# Patient Record
Sex: MALE | Race: WHITE
[De-identification: names, ages, dates, MRNs, and addresses within clinical notes are randomized per-mention and may not be internally consistent; named-entity substitution may affect disease eponyms.]

---

## 2017-05-15 NOTE — HP
[facility]





- Chief Complaint


Chief Complaint: left knee pain





- Past Medical History


Allergies/Adverse Reactions: 


 Allergies











Allergy/AdvReac Type Severity Reaction Status Date / Time


 


No Known Drug Allergies Allergy   Verified 05/01/17 13:49














- Current Medications


Current Medications: 


 Home Medications











 Medication  Instructions  Recorded


 


Aspirin Coated [Ecotrin -] 325 mg PO DAILY 10/02/12


 


Cholecalciferol (Vitamin D3) 50,000 unit PO WEEKLY 10/02/12





[Vitamin D3]  


 


Metformin HCl [Glucophage] 1,000 mg PO BID 10/02/12


 


Omeprazole [Prilosec (RX)] 20 mg PO DAILY 10/02/12


 


Simvastatin [Zocor -] 40 mg PO HS 10/02/12


 


Allopurinol 300 mg PO DAILY 05/01/17


 


Liraglutide [Victoza -] 1.8 mg SQ DAILY@0700 05/01/17


 


Metoprolol Succinate [Toprol Xl -] 25 mg PO BID 05/01/17


 


Ramipril 10 mg PO DAILY 05/01/17


 


Tamsulosin HCl 0.4 mg PO HS 05/01/17


 


Timolol [Betimol] 1 drp OD DAILY 05/01/17














Satellite Physical Exam





- Physical Examination


General Appearance: Well Nourished, Well Developed, Alert & Oriented x3


ENT: Clear


Lung: Normal air movement


Heart: Regular rate & rhythm


Extremities: Other (left knee -+ swelling, + ttp, dec rom, nvi xrays show 

severe tricompartmental djd)


Neurological: Intact, Alert, Oriented





Satellite Impression/Plan





- Impression/Plan


Impression: left knee djd


Operative Procedure: left kathleen tkr


Date to be Performed: 05/16/17

## 2017-05-16 ENCOUNTER — HOSPITAL ENCOUNTER (INPATIENT)
Dept: HOSPITAL 74 - FM/S | Age: 67
LOS: 2 days | Discharge: HOME HEALTH | DRG: 470 | End: 2017-05-18
Attending: ORTHOPAEDIC SURGERY | Admitting: ORTHOPAEDIC SURGERY
Payer: COMMERCIAL

## 2017-05-16 VITALS — BODY MASS INDEX: 38 KG/M2

## 2017-05-16 DIAGNOSIS — E78.5: ICD-10-CM

## 2017-05-16 DIAGNOSIS — I10: ICD-10-CM

## 2017-05-16 DIAGNOSIS — E11.9: ICD-10-CM

## 2017-05-16 DIAGNOSIS — E66.9: ICD-10-CM

## 2017-05-16 DIAGNOSIS — M17.12: Primary | ICD-10-CM

## 2017-05-16 PROCEDURE — 8E0Y0CZ ROBOTIC ASSISTED PROCEDURE OF LOWER EXTREMITY, OPEN APPROACH: ICD-10-PCS | Performed by: ORTHOPAEDIC SURGERY

## 2017-05-16 PROCEDURE — 0SRD0J9 REPLACEMENT OF LEFT KNEE JOINT WITH SYNTHETIC SUBSTITUTE, CEMENTED, OPEN APPROACH: ICD-10-PCS | Performed by: ORTHOPAEDIC SURGERY

## 2017-05-16 RX ADMIN — INSULIN ASPART SCH: 100 INJECTION, SOLUTION INTRAVENOUS; SUBCUTANEOUS at 17:04

## 2017-05-16 RX ADMIN — DOCUSATE SODIUM,SENNOSIDES SCH TABLET: 50; 8.6 TABLET, FILM COATED ORAL at 21:49

## 2017-05-16 RX ADMIN — TAMSULOSIN HYDROCHLORIDE SCH MG: 0.4 CAPSULE ORAL at 21:49

## 2017-05-16 RX ADMIN — ACETAMINOPHEN SCH MG: 325 TABLET ORAL at 20:19

## 2017-05-16 RX ADMIN — ONDANSETRON PRN MG: 2 INJECTION INTRAMUSCULAR; INTRAVENOUS at 17:22

## 2017-05-16 RX ADMIN — CEFAZOLIN SODIUM SCH MLS/HR: 2 SOLUTION INTRAVENOUS at 19:50

## 2017-05-16 RX ADMIN — ATORVASTATIN CALCIUM SCH MG: 40 TABLET, FILM COATED ORAL at 21:49

## 2017-05-16 RX ADMIN — INSULIN ASPART SCH: 100 INJECTION, SOLUTION INTRAVENOUS; SUBCUTANEOUS at 22:14

## 2017-05-16 RX ADMIN — GABAPENTIN SCH MG: 300 CAPSULE ORAL at 21:50

## 2017-05-16 RX ADMIN — ONDANSETRON PRN MG: 2 INJECTION INTRAMUSCULAR; INTRAVENOUS at 15:17

## 2017-05-16 RX ADMIN — OXYCODONE HYDROCHLORIDE SCH MG: 10 TABLET, FILM COATED, EXTENDED RELEASE ORAL at 21:49

## 2017-05-16 RX ADMIN — METOPROLOL SUCCINATE SCH MG: 25 TABLET, EXTENDED RELEASE ORAL at 21:49

## 2017-05-16 NOTE — OP
Operative Note





- Note:


Operative Date: 05/16/17 (freida)


Pre-Operative Diagnosis: left knee djd


Operation: left kathleen tkr


Post-Operative Diagnosis: Same as Pre-op


Surgeon: Farzad Watson


Assistant: Harsh Phelps


Anesthesiologist/CRNA: Virginia Malhotra


Anesthesia: Spinal, Local


Specimens Removed: bone fragments


Estimated Blood Loss (mls): 50 (tourniquet)


Operative Report Dictated: Yes

## 2017-05-16 NOTE — SPEC
DATE OF SURGERY:  05/16/2017

 

OPERATION:  Left total knee replacement with robotic-assisted navigation

(MAKOplasty).

 

PREOPERATIVE DIAGNOSIS:  Degenerative joint disease, left knee.

 

POSTOPERATIVE DIAGNOSIS:  Degenerative joint disease, left knee.

 

SURGEON:  Farzad Watson M.D.

 

ASSISTANT:  KAREN Mckeon

 

ANESTHESIA:   Spinal and regional.

 

CLOSURE:   A Triathlon knee system with a 6 femur, a 6 tibia, an 11 TS polyethylene,

a 38 patella.  A number 1 Vicryl fascia, 0 and 2-0 for subcutaneous, 3-0 Monocryl

subcuticular with skin glue for skin, 4-0 undyed Vicryl for pin sites.

 

ESTIMATED BLOOD LOSS:  Negligible.

 

TOURNIQUET TIME:  Approximately 90 minutes.

 

COMPLICATIONS:  None

 

CONDITION:  To recovery room in stable condition.

 

DESCRIPTION OF PROCEDURE:  Patient was taken to the operating room on May 16, 2017. 

Regional and spinal anesthesia were administered by the anesthesiologist.  IV

antibiotics and TXA were administered prophylactically prior to the case.  A

well-padded pneumatic tourniquet was placed on the left proximal thigh.  The left

lower extremity was prepped and draped in the usual sterile fashion.  An

approximately 12-cm midline incision centered over the patella was incised. 

Hemostasis was achieved with Bovie cautery.  Sharp dissection was carried down to the

level of the extensor mechanism _____ the procedure.  The medial parapatellar

arthrotomy was then performed.  The patella was inverted and the knee was flexed up

to 90 degrees.  Subperiosteal dissection was performed on the anteromedial proximal

tibia until the knee was able to be brought forward.  This was facilitated by taking

the ACL, the PCL, and the medial and lateral menisci.  A checkpoint was malleted into

the medial femoral condyle and into the anteromedial proximal tibia.  Through two

small stab incisions in the mid femur and two in the mid tibia, two bicortical pins

were drilled, achieving excellent height.  Two of these pins were attached to the

navigation arrays.  The knee was then registered with the navigation device by

rotating the hip to ascertain the center of rotation of the hip with points on both

the medial and lateral malleoli and multiple points on both the femur and on the

tibia.  Excellent registration was confirmed by "popping the bubbles."  At this time,

the osteophytes on the edges of the proximal tibia both medially and laterally, as

well as on the medial lateral femoral condyles underneath the collateral ligaments

were debrided.  The knee was stressed in extension and in flexion to confirm good

gaps.  The virtual positions of the components were then optimized in order to have a

balanced knee, both in extension and in 90 degrees of flexion.  The sizes of the

components were also optimized to get good coverage over both the tibia and the femur

and to produce equal gaps in extension and flexion with the appropriate amount of

external rotation of the femur, the appropriate amount of flexion of the femoral

component and the appropriate slope on the tibial component.  At this time, the robot

was brought into the field and registered.  The robot was used to cut the proximal

tibia and to make all the cuts on the distal femur.  The bone was then removed.  A

spacer block in extension and flexion was used to confirm equal balancing of the

component in both extension and 90 degrees of flexion.  The box for the posterior

cruciate sacrificing component was then performed and a trial component on the femur

and tibia was applied.  The femoral component was clipped into place with the

appropriate external rotation.  This was confirmed by the navigation device, ensuring

the appropriate position of the tibial component on the proximal tibia.  The patella

was calipered for thickness and osteotomized at the appropriate level.  A lollipop

was used to drill the three lugholes in the patella and then a trial component was

applied.  The knee was taken through a range of motion and found to have excellent

tracking of the patella from full extension to full flexion, with good stability,

varus/valgus throughout range of motion.  The trial components were then removed. 

Before removing the tibial tray, the keyhole was made.  The knee was then thoroughly

irrigated with antibiotic irrigation.  The real components were then cemented in,

using modern generation cement techniques with antibiotic cement and pressurization. 

After the cement was hardened, the knee was thoroughly inspected to remove all excess

cement.  The real polyethylene component was then clipped into place.  Again, range

of motion, stability and tracking were found to be excellent throughout.  The knee

was then pulse antibiotic irrigated and dried.  Vancomycin powder was placed into the

knee.  The checkpoints were removed.  The medial parapatellar arthrotomy was then

closed using number 1 Vicryl interrupted suture.  The knee was again taken through

range of motion and found to have no undue tension on the repair and good tracking

throughout.  The subcutaneous was closed with 0 and 2-0 Vicryl and 3-0 Monocryl

subcuticular for skin with skin glue.  The pins were removed in the femur and the

tibia and pulse antibiotic irrigated and closed with 4-0 undyed Vicryl.  Sterile

Aquacel dressing followed by a Castorena dressing was applied.  The tourniquet was then

deflated.  One more dose of TXA was administered at the end of the case.  The patient

was awakened from anesthesia and transferred to the recovery room in stable

condition.  X-rays revealed good position of the components.  There were no

complications.  Estimated blood loss was negligible.  Total tourniquet time was

approximately 90 minutes. 

 

 

BARI [id number]

DD: 05/16/2017 16:11

DT: 05/16/2017 19:29

Job #:  [id number]

## 2017-05-17 LAB
DEPRECATED RDW RBC AUTO: 15 % (ref 11.9–15.9)
MCH RBC QN AUTO: 29.9 PG (ref 25.7–33.7)
MCHC RBC AUTO-ENTMCNC: 32.8 G/DL (ref 32–35.9)
MCV RBC: 91 FL (ref 80–96)
PLATELET # BLD AUTO: 281 K/MM3 (ref 134–434)
PMV BLD: 9.4 FL (ref 7.5–11.1)
WBC # BLD AUTO: 9.3 K/MM3 (ref 4–10.8)

## 2017-05-17 RX ADMIN — OXYCODONE HYDROCHLORIDE SCH MG: 10 TABLET, FILM COATED, EXTENDED RELEASE ORAL at 21:32

## 2017-05-17 RX ADMIN — METFORMIN HYDROCHLORIDE SCH MG: 500 TABLET ORAL at 18:47

## 2017-05-17 RX ADMIN — INSULIN ASPART SCH UNITS: 100 INJECTION, SOLUTION INTRAVENOUS; SUBCUTANEOUS at 16:53

## 2017-05-17 RX ADMIN — FERROUS SULFATE TAB EC 324 MG (65 MG FE EQUIVALENT) SCH MG: 324 (65 FE) TABLET DELAYED RESPONSE at 10:17

## 2017-05-17 RX ADMIN — GABAPENTIN SCH MG: 300 CAPSULE ORAL at 21:32

## 2017-05-17 RX ADMIN — RAMIPRIL SCH MG: 5 CAPSULE ORAL at 10:17

## 2017-05-17 RX ADMIN — METFORMIN HYDROCHLORIDE SCH MG: 500 TABLET ORAL at 08:17

## 2017-05-17 RX ADMIN — ACETAMINOPHEN SCH MG: 325 TABLET ORAL at 08:18

## 2017-05-17 RX ADMIN — TAMSULOSIN HYDROCHLORIDE SCH MG: 0.4 CAPSULE ORAL at 21:31

## 2017-05-17 RX ADMIN — DOCUSATE SODIUM,SENNOSIDES SCH TABLET: 50; 8.6 TABLET, FILM COATED ORAL at 21:31

## 2017-05-17 RX ADMIN — PANTOPRAZOLE SODIUM SCH MG: 20 TABLET, DELAYED RELEASE ORAL at 10:18

## 2017-05-17 RX ADMIN — METOPROLOL SUCCINATE SCH MG: 25 TABLET, EXTENDED RELEASE ORAL at 10:19

## 2017-05-17 RX ADMIN — ACETAMINOPHEN SCH MG: 325 TABLET ORAL at 03:26

## 2017-05-17 RX ADMIN — ALLOPURINOL SCH MG: 300 TABLET ORAL at 10:19

## 2017-05-17 RX ADMIN — CEFAZOLIN SODIUM SCH MLS/HR: 2 SOLUTION INTRAVENOUS at 03:26

## 2017-05-17 RX ADMIN — ACETAMINOPHEN SCH MG: 325 TABLET ORAL at 15:16

## 2017-05-17 RX ADMIN — ASPIRIN SCH MG: 325 TABLET ORAL at 10:17

## 2017-05-17 RX ADMIN — ACETAMINOPHEN SCH MG: 325 TABLET ORAL at 21:32

## 2017-05-17 RX ADMIN — GABAPENTIN SCH MG: 300 CAPSULE ORAL at 10:17

## 2017-05-17 RX ADMIN — MULTIVITAMIN TABLET SCH TAB: TABLET at 10:18

## 2017-05-17 RX ADMIN — INSULIN ASPART SCH: 100 INJECTION, SOLUTION INTRAVENOUS; SUBCUTANEOUS at 11:15

## 2017-05-17 RX ADMIN — METOPROLOL SUCCINATE SCH MG: 25 TABLET, EXTENDED RELEASE ORAL at 21:31

## 2017-05-17 RX ADMIN — TIMOLOL MALEATE SCH DROP: 5 SOLUTION OPHTHALMIC at 10:18

## 2017-05-17 RX ADMIN — ATORVASTATIN CALCIUM SCH MG: 40 TABLET, FILM COATED ORAL at 21:31

## 2017-05-17 RX ADMIN — OXYCODONE HYDROCHLORIDE SCH MG: 10 TABLET, FILM COATED, EXTENDED RELEASE ORAL at 10:16

## 2017-05-17 RX ADMIN — INSULIN ASPART SCH: 100 INJECTION, SOLUTION INTRAVENOUS; SUBCUTANEOUS at 06:34

## 2017-05-17 RX ADMIN — DOCUSATE SODIUM,SENNOSIDES SCH TABLET: 50; 8.6 TABLET, FILM COATED ORAL at 10:17

## 2017-05-17 NOTE — PN
Progress Note (short form)





- Note


Progress Note: 


Ortho





Pt seen and examined s/p left kathleen tkr pod #1


 Selected Entries











  05/17/17





  06:17


 


Temperature 98.5 F


 


Pulse Rate 91 H


 


Respiratory 18





Rate 


 


Blood Pressure 132/64








 Laboratory Tests











  05/17/17





  07:18


 


WBC  Pending


 


Hgb  Pending


 


Hct  Pending


 


Plt Count  Pending








dressing c/d/i, calf soft, nt


rom 0-50, nvi





a/p


PT


dvt ppx


pain control


d/c home tomorrow if stable

## 2017-05-17 NOTE — PN
Progress Note (short form)





- Note


Progress Note: 


ANESTHESIOLOGY POST-OP CHECK





66M s/p left knee replacement under spinal anesthesia with adductor canal block 

and catheter and tibial nerve block, POD #1.  no acute complaints. Denies N/V, 

backache, headache, and knee pain. Ambulating and voiding. C/o left lower 

extremity calf and heel pain which are chronic and relieved with oxycodone. Pt 

also states he has history of left foot drop/extensor weakness.


 





               Vital Signs











Temperature  98.5 F   05/17/17 06:17


 


Pulse Rate  91 H  05/17/17 06:17


 


Respiratory Rate  18   05/17/17 06:17


 


Blood Pressure  132/64   05/17/17 06:17


 


O2 Sat by Pulse Oximetry (%)  98   05/17/17 06:17








Active Medications





Acetaminophen (Tylenol -)  650 mg PO Q6H Pending sale to [facility]


   Stop: 05/19/17 20:59


   Last Admin: 05/17/17 08:18 Dose:  650 mg


Al Hydroxide/Mg Hydroxide (Mylanta Oral Suspension -)  30 ml PO Q4H PRN


   PRN Reason: DYSPEPSIA


Allopurinol (Zyloprim -)  300 mg PO DAILY Pending sale to [facility]


   Last Admin: 05/17/17 10:19 Dose:  300 mg


Aspirin (Ecotrin -)  325 mg PO DAILY Pending sale to [facility]


   Last Admin: 05/17/17 10:17 Dose:  325 mg


Atorvastatin Calcium (Lipitor -)  40 mg PO HS Pending sale to [facility]


   Last Admin: 05/16/17 21:49 Dose:  40 mg


Fentanyl (Sublimaze Injection -)  25 mcg IVPUSH [id number] PRN


   PRN Reason: PAIN


   Stop: 05/19/17 12:54


   Last Admin: 05/16/17 15:15 Dose:  25 mcg


Ferrous Sulfate (Feosol -)  325 mg PO DAILY Pending sale to [facility]


   Last Admin: 05/17/17 10:17 Dose:  325 mg


Gabapentin (Neurontin -)  300 mg PO BID Pending sale to [facility]


   Last Admin: 05/17/17 10:17 Dose:  300 mg


Lactated Ringer's (Lactated Ringers Solution)  1,000 mls @ 125 mls/hr IV ASDIR 

Pending sale to [facility]


Insulin Aspart (Novolog Vial Sliding Scale -)  1 vial SQ ACHS Pending sale to [facility]


   PRN Reason: Protocol


   Last Admin: 05/17/17 06:34 Dose:  Not Given


Liraglutide (Victoza -)  1.8 mg SQ DAILY@0700 Pending sale to [facility]


Magnesium Hydroxide (Milk Of Magnesia -)  30 ml PO PRN PRN


   PRN Reason: CONSTIPATION


Metformin HCl (Glucophage -)  1,000 mg PO BIDI Pending sale to [facility]


   Last Admin: 05/17/17 08:17 Dose:  1,000 mg


Metoprolol Succinate (Toprol Xl -)  25 mg PO BID Pending sale to [facility]


   Last Admin: 05/17/17 10:19 Dose:  25 mg


Multivitamins/Minerals/Vitamin C (Tab-A-Vit -)  1 tab PO DAILY Pending sale to [facility]


   Last Admin: 05/17/17 10:18 Dose:  1 tab


Ondansetron HCl (Zofran Injection)  4 mg IVPB Q6H PRN


   PRN Reason: NAUSEA


   Last Admin: 05/16/17 15:17 Dose:  4 mg


Oxycodone HCl (Oxycontin -)  10 mg PO BID Pending sale to [facility]


   Stop: 05/19/17 12:53


   Last Admin: 05/17/17 10:16 Dose:  10 mg


Oxycodone HCl (Roxicodone -)  5 mg PO Q4H PRN


   PRN Reason: PAIN LEVEL 1-5


   Stop: 05/19/17 12:53


   Last Admin: 05/17/17 08:35 Dose:  5 mg


Oxycodone HCl (Roxicodone -)  10 mg PO Q4H PRN


   PRN Reason: PAIN LEVEL 6-10


   Stop: 05/19/17 12:53


   Last Admin: 05/16/17 20:20 Dose:  10 mg


Pantoprazole Sodium (Protonix -)  20 mg PO DAILY Pending sale to [facility]


   Last Admin: 05/17/17 10:18 Dose:  20 mg


Ramipril (Altace -)  10 mg PO DAILY Pending sale to [facility]


   Last Admin: 05/17/17 10:17 Dose:  10 mg


Senna/Docusate Sodium (Pericolace -)  1 tablet PO BID Pending sale to [facility]


   Last Admin: 05/17/17 10:17 Dose:  1 tablet


Tamsulosin HCl (Flomax -)  0.4 mg PO HS Pending sale to [facility]


   Last Admin: 05/16/17 21:49 Dose:  0.4 mg


Timolol Maleate (Timoptic 0.5%)  1 drop OD DAILY Pending sale to [facility]


   Last Admin: 05/17/17 10:18 Dose:  1 drop








Gen: Awake, alert


Ext: No motor or sesnory deficits of right lower extremity. Decreased sensation 

of left lower leg on medial and lateral aspects. Weak dorsiflexion of left foot 

and extension of toes. Adductor canal catheter site clean, dry, intact.





No apparent anesthesia complications. Pain well controlled. Will pull adductor 

canal catheter tomorrow. Continue management as per primary team.

## 2017-05-18 VITALS — SYSTOLIC BLOOD PRESSURE: 104 MMHG | DIASTOLIC BLOOD PRESSURE: 53 MMHG | HEART RATE: 85 BPM | TEMPERATURE: 99.6 F

## 2017-05-18 LAB
DEPRECATED RDW RBC AUTO: 14.5 % (ref 11.9–15.9)
MCH RBC QN AUTO: 30 PG (ref 25.7–33.7)
MCHC RBC AUTO-ENTMCNC: 33.7 G/DL (ref 32–35.9)
MCV RBC: 89.2 FL (ref 80–96)
PLATELET # BLD AUTO: 231 K/MM3 (ref 134–434)
PMV BLD: 9.1 FL (ref 7.5–11.1)
WBC # BLD AUTO: 11.9 K/MM3 (ref 4–10.8)

## 2017-05-18 RX ADMIN — GABAPENTIN SCH MG: 300 CAPSULE ORAL at 10:00

## 2017-05-18 RX ADMIN — METFORMIN HYDROCHLORIDE SCH MG: 500 TABLET ORAL at 06:23

## 2017-05-18 RX ADMIN — ACETAMINOPHEN SCH MG: 325 TABLET ORAL at 03:11

## 2017-05-18 RX ADMIN — INSULIN ASPART SCH: 100 INJECTION, SOLUTION INTRAVENOUS; SUBCUTANEOUS at 06:24

## 2017-05-18 RX ADMIN — RAMIPRIL SCH MG: 5 CAPSULE ORAL at 10:02

## 2017-05-18 RX ADMIN — MULTIVITAMIN TABLET SCH TAB: TABLET at 10:01

## 2017-05-18 RX ADMIN — TIMOLOL MALEATE SCH DROP: 5 SOLUTION OPHTHALMIC at 10:03

## 2017-05-18 RX ADMIN — OXYCODONE HYDROCHLORIDE SCH MG: 10 TABLET, FILM COATED, EXTENDED RELEASE ORAL at 09:59

## 2017-05-18 RX ADMIN — ASPIRIN SCH MG: 325 TABLET ORAL at 10:01

## 2017-05-18 RX ADMIN — ACETAMINOPHEN SCH MG: 325 TABLET ORAL at 09:58

## 2017-05-18 RX ADMIN — METOPROLOL SUCCINATE SCH MG: 25 TABLET, EXTENDED RELEASE ORAL at 10:00

## 2017-05-18 RX ADMIN — FERROUS SULFATE TAB EC 324 MG (65 MG FE EQUIVALENT) SCH MG: 324 (65 FE) TABLET DELAYED RESPONSE at 10:03

## 2017-05-18 RX ADMIN — ALLOPURINOL SCH MG: 300 TABLET ORAL at 10:03

## 2017-05-18 RX ADMIN — DOCUSATE SODIUM,SENNOSIDES SCH TABLET: 50; 8.6 TABLET, FILM COATED ORAL at 10:01

## 2017-05-18 RX ADMIN — INSULIN ASPART SCH: 100 INJECTION, SOLUTION INTRAVENOUS; SUBCUTANEOUS at 06:25

## 2017-05-18 RX ADMIN — PANTOPRAZOLE SODIUM SCH MG: 20 TABLET, DELAYED RELEASE ORAL at 10:01

## 2017-05-18 NOTE — PN
Progress Note (short form)





- Note


Progress Note: 


Ortho





Pt seen and examined s/p left kathleen tkr pod #2


 


 Selected Entries











  05/18/17





  06:00


 


Temperature 99.6 F


 


Pulse Rate 85


 


Respiratory 19





Rate 


 


Blood Pressure 104/53








 Laboratory Tests











  05/17/17





  07:18


 


WBC  9.3  D


 


Hgb  12.4


 


Hct  37.7


 


Plt Count  281











dressing c/d/i, calf soft, nt


rom 0-50, nvi





a/p


PT


dvt ppx


pain control


d/c home today


f/u in 1 week

## 2017-05-18 NOTE — DS
Physical Examination


Vital Signs: 


 Vital Signs











Temperature  99.6 F   05/18/17 06:00


 


Pulse Rate  85   05/18/17 06:00


 


Respiratory Rate  19   05/18/17 06:00


 


Blood Pressure  104/53   05/18/17 06:00


 


O2 Sat by Pulse Oximetry (%)  91 L  05/18/17 06:00














Discharge Summary


Reason For Visit: OSTEOARTHRITIS


Procedures: Principal: s/p left kathleen tkr


Hospital Course: 


admitted for elective left kathleen tkr, uneventful post-op, stable for d/c


Condition: Good





- Instructions


Diet, Activity, Other Instructions: 


Post-op Instructions-Total Knee Replacement                                    

               





     Call the office for a follow-up  appointment in 1 week - [phone]


     Aspirin 325mg daily for 6 weeks. Pain medication was sent into your 

pharmacy.                      


     Apply  Graduated Compression Stockings (TEDs) to both lower extremities-

remove daily


      for hygiene  ONLY


     Apply Sequential Compression Device (SCDs)  to both Lower extremities 

remove for PT 


      and hygiene ONLY  


     Apply cold packs to affected area for 15 minutes every 2 hours.


     Physical Therapist will come to your home for the first 5 days. You will 

be set up with 


      outpatient PT at your first post-operative visit.


     Patient may ambulate as tolerated-encourage self care (at least every 2-3 

hours while awake) 


      with walker or cane


     Maintain Aquacel (waterproof) dressing to operative wound (will be 

removed by surgeon at 


      first office visit)


     Shower with Aquacel dressing in place-if Aquacel integrity compromised, 

remove and apply 


      dry sterile dressing and notify Orthopedist.  DO NOT SHOWER unless 

Orthopedists approves without Aquacel 


      dressing





     CONTACT THE OFFICE FOR ANY CHANGE IN YOUR CONDITION (for example-fever 


      greater than 102 degrees,excessive bleeding from operative site, purulent 

drainage, 


      severe swelling or pain)    GO TO THE EMERGENCY ROOM IF THERE IS A  

MEDICAL EMERGENCY





     Knee Precautions:  


      * Keep a rolled towel under affected heel while in bed or chair (to keep 

knee 


        in extension)


      * Keep affected leg elevated except during mealtimes


      * DO NOT PLACE PILLOW UNDER AFFECTED KNEE





  * If you have any questions, please do not hesitate to call the office - [phone].





Referrals: 


Farzad Watson MD [Staff Physician] - 


Disposition: [facility]/HOME HEALTH CARE





- Home Medications


Comprehensive Discharge Medication List: 


Ambulatory Orders





Aspirin Coated [Ecotrin -] 325 mg PO DAILY 10/02/12 


Metformin HCl [Glucophage] 1,000 mg PO BID 10/02/12 


Omeprazole [Prilosec (RX)] 20 mg PO DAILY 10/02/12 


Allopurinol 300 mg PO DAILY 05/01/17 


Liraglutide [Victoza -] 1.8 mg SQ DAILY@0700 05/01/17 


Metoprolol Succinate [Toprol XL -] 25 mg PO BID 05/01/17 


Ramipril 10 mg PO DAILY 05/01/17 


Tamsulosin HCl 0.4 mg PO HS 05/01/17 


Timolol [Betimol] 1 drp OD DAILY 05/01/17 


Acetaminophen [Tylenol .Extra-Strength -] 500 mg PO Q6H PRN 05/16/17 


Atorvastatin Ca [Lipitor] 40 mg PO DAILY 05/16/17 


Ferrous Sulfate 325 mg PO DAILY 05/16/17 


Oxycodone HCl/Acetaminophen [Percocet 5-325 mg Tablet -] 1 - 2 tab PO Q6H #50 

tab MDD 8 05/16/17

## 2017-05-18 NOTE — PN
Progress Note (short form)





- Note


Progress Note: 


S: Pt. in a chair. comfortable


O: VAS 3-7/10


A/P: POD #2 s/p left tkr


1. adductor canal cath pulled. tip intact. no complications

## 2017-05-24 NOTE — PATH
Surgical Pathology Report



Patient Name:  RICH CURIEL

Accession #:  

Med. Rec. #:  [id number]                                                        

   /Age/Gender:  1950 (Age: 66) / M

Account:  [id number]                                                          

             Location: [facility]

Taken:  2017

Received:  2017

Reported:  2017

Physicians:  Farzad Watson M.D.

  



Specimen(s) Received

 LEFT KNEE BONE AND TISSUE 





Clinical History

Left knee osteoarthritis







Final Diagnosis

KNEE BONE AND TISSUE, LEFT, TOTAL KNEE REPLACEMENT:

DEGENERATIVE JOINT DISEASE.





***Electronically Signed***

Kaye Mcrae M.D.





Gross Description

Received in formalin labeled "left knee bone and tissue," is a 12.0 x 9.5 x 1.5

cm aggregate of multiple irregular, unoriented portions of bone and soft tissue.

The tibial plateau measures 8.5 x 5.7 x 1.9 cm. There is a 1.5 cm in greatest

dimension area of eburnation present. The remaining articular surfaces are

tan-yellow and focally granular. The underlying trabecular bone is yellow and

hard. Representative sections are submitted in one cassette, following

decalcification.

## 2022-09-21 ENCOUNTER — RX ONLY (RX ONLY)
Age: 72
End: 2022-09-21

## 2022-09-21 ENCOUNTER — OFFICE (OUTPATIENT)
Dept: URBAN - METROPOLITAN AREA CLINIC 30 | Facility: CLINIC | Age: 72
Setting detail: OPHTHALMOLOGY
End: 2022-09-21
Payer: MEDICARE

## 2022-09-21 DIAGNOSIS — E11.9: ICD-10-CM

## 2022-09-21 DIAGNOSIS — H40.1312: ICD-10-CM

## 2022-09-21 DIAGNOSIS — H25.13: ICD-10-CM

## 2022-09-21 DIAGNOSIS — H40.1321: ICD-10-CM

## 2022-09-21 PROCEDURE — 92133 CPTRZD OPH DX IMG PST SGM ON: CPT | Performed by: OPHTHALMOLOGY

## 2022-09-21 PROCEDURE — 99204 OFFICE O/P NEW MOD 45 MIN: CPT | Performed by: OPHTHALMOLOGY

## 2022-09-21 PROCEDURE — 92083 EXTENDED VISUAL FIELD XM: CPT | Performed by: OPHTHALMOLOGY

## 2022-09-21 PROCEDURE — 92020 GONIOSCOPY: CPT | Performed by: OPHTHALMOLOGY

## 2022-09-21 PROCEDURE — 76514 ECHO EXAM OF EYE THICKNESS: CPT | Performed by: OPHTHALMOLOGY

## 2022-09-21 ASSESSMENT — REFRACTION_AUTOREFRACTION
OD_AXIS: 145
OD_CYLINDER: +0.50
OD_SPHERE: -13.75
OS_CYLINDER: +0.50
OS_AXIS: 115
OS_SPHERE: -8.25

## 2022-09-21 ASSESSMENT — TONOMETRY
OS_IOP_MMHG: 11
OD_IOP_MMHG: 12

## 2022-09-21 ASSESSMENT — PACHYMETRY
OD_CT_UM: 579
OD_CT_CORRECTION: -2
OS_CT_UM: 582
OS_CT_CORRECTION: -3

## 2022-09-21 ASSESSMENT — SPHEQUIV_DERIVED
OS_SPHEQUIV: -8
OD_SPHEQUIV: -13.5

## 2022-09-21 ASSESSMENT — CONFRONTATIONAL VISUAL FIELD TEST (CVF)
OD_FINDINGS: FULL
OS_FINDINGS: FULL

## 2022-09-21 ASSESSMENT — VISUAL ACUITY
OS_BCVA: CF 3FT
OD_BCVA: 20/70

## 2022-09-21 ASSESSMENT — REFRACTION_CURRENTRX
OS_OVR_VA: 20/
OD_CYLINDER: +1.00
OD_AXIS: 115
OS_CYLINDER: +1.00
OD_SPHERE: -8.50
OS_AXIS: 94
OS_SPHERE: -8.25
OD_OVR_VA: 20/

## 2022-11-29 ENCOUNTER — OFFICE (OUTPATIENT)
Dept: URBAN - METROPOLITAN AREA CLINIC 29 | Facility: CLINIC | Age: 72
Setting detail: OPHTHALMOLOGY
End: 2022-11-29
Payer: MEDICARE

## 2022-11-29 DIAGNOSIS — E11.9: ICD-10-CM

## 2022-11-29 DIAGNOSIS — H40.1312: ICD-10-CM

## 2022-11-29 DIAGNOSIS — H25.12: ICD-10-CM

## 2022-11-29 DIAGNOSIS — H40.1321: ICD-10-CM

## 2022-11-29 DIAGNOSIS — H52.213: ICD-10-CM

## 2022-11-29 DIAGNOSIS — H25.11: ICD-10-CM

## 2022-11-29 PROCEDURE — 92136 OPHTHALMIC BIOMETRY: CPT | Performed by: OPHTHALMOLOGY

## 2022-11-29 PROCEDURE — 99213 OFFICE O/P EST LOW 20 MIN: CPT | Performed by: OPHTHALMOLOGY

## 2022-11-29 PROCEDURE — 92025 CPTRIZED CORNEAL TOPOGRAPHY: CPT | Performed by: OPHTHALMOLOGY

## 2022-11-29 ASSESSMENT — REFRACTION_AUTOREFRACTION
OD_CYLINDER: +0.50
OS_SPHERE: -8.25
OD_AXIS: 145
OS_AXIS: 115
OD_SPHERE: -13.75
OS_CYLINDER: +0.50

## 2022-11-29 ASSESSMENT — SPHEQUIV_DERIVED
OS_SPHEQUIV: -8
OD_SPHEQUIV: -13.5

## 2022-11-29 ASSESSMENT — CONFRONTATIONAL VISUAL FIELD TEST (CVF)
OD_FINDINGS: FULL
OS_FINDINGS: FULL

## 2022-11-29 ASSESSMENT — PACHYMETRY
OS_CT_CORRECTION: -3
OS_CT_UM: 582
OD_CT_CORRECTION: -2
OD_CT_UM: 579

## 2022-11-29 ASSESSMENT — REFRACTION_CURRENTRX
OD_OVR_VA: 20/
OD_SPHERE: -8.50
OS_CYLINDER: +1.00
OD_CYLINDER: +1.00
OS_AXIS: 94
OS_OVR_VA: 20/
OD_AXIS: 115
OS_SPHERE: -8.25

## 2022-11-29 ASSESSMENT — VISUAL ACUITY
OD_BCVA: 20/70+1
OS_BCVA: CF 3FT

## 2022-11-29 ASSESSMENT — TONOMETRY
OD_IOP_MMHG: 15
OS_IOP_MMHG: 12

## 2022-12-22 ENCOUNTER — AMBULATORY SURGERY CENTER (OUTPATIENT)
Dept: URBAN - METROPOLITAN AREA SURGERY 17 | Facility: SURGERY | Age: 72
Setting detail: OPHTHALMOLOGY
End: 2022-12-22
Payer: MEDICARE

## 2022-12-22 DIAGNOSIS — H52.211: ICD-10-CM

## 2022-12-22 DIAGNOSIS — H40.1312: ICD-10-CM

## 2022-12-22 DIAGNOSIS — H25.11: ICD-10-CM

## 2022-12-22 PROCEDURE — A9270 NON-COVERED ITEM OR SERVICE: HCPCS | Performed by: OPHTHALMOLOGY

## 2022-12-22 PROCEDURE — FEMTO CATARACT LASER: Performed by: OPHTHALMOLOGY

## 2022-12-22 PROCEDURE — 65820 GONIOTOMY: CPT | Performed by: OPHTHALMOLOGY

## 2022-12-22 PROCEDURE — 66984 XCAPSL CTRC RMVL W/O ECP: CPT | Performed by: OPHTHALMOLOGY

## 2022-12-23 ENCOUNTER — RX ONLY (RX ONLY)
Age: 72
End: 2022-12-23

## 2022-12-23 ENCOUNTER — OFFICE (OUTPATIENT)
Dept: URBAN - METROPOLITAN AREA CLINIC 29 | Facility: CLINIC | Age: 72
Setting detail: OPHTHALMOLOGY
End: 2022-12-23
Payer: MEDICARE

## 2022-12-23 DIAGNOSIS — H21.233: ICD-10-CM

## 2022-12-23 DIAGNOSIS — E11.9: ICD-10-CM

## 2022-12-23 DIAGNOSIS — H40.1321: ICD-10-CM

## 2022-12-23 DIAGNOSIS — Z96.1: ICD-10-CM

## 2022-12-23 DIAGNOSIS — H52.213: ICD-10-CM

## 2022-12-23 DIAGNOSIS — H40.1312: ICD-10-CM

## 2022-12-23 DIAGNOSIS — H25.13: ICD-10-CM

## 2022-12-23 PROCEDURE — 99024 POSTOP FOLLOW-UP VISIT: CPT | Performed by: OPHTHALMOLOGY

## 2022-12-23 ASSESSMENT — REFRACTION_CURRENTRX
OD_AXIS: 115
OS_AXIS: 94
OD_OVR_VA: 20/
OS_SPHERE: -8.25
OD_CYLINDER: +1.00
OS_CYLINDER: +1.00
OD_SPHERE: -8.50
OS_OVR_VA: 20/

## 2022-12-23 ASSESSMENT — SPHEQUIV_DERIVED
OS_SPHEQUIV: -8
OD_SPHEQUIV: -13.5

## 2022-12-23 ASSESSMENT — CONFRONTATIONAL VISUAL FIELD TEST (CVF)
OS_FINDINGS: FULL
OD_FINDINGS: FULL

## 2022-12-23 ASSESSMENT — REFRACTION_AUTOREFRACTION
OS_AXIS: 115
OD_AXIS: 145
OD_CYLINDER: +0.50
OD_SPHERE: -13.75
OS_CYLINDER: +0.50
OS_SPHERE: -8.25

## 2022-12-23 ASSESSMENT — TONOMETRY
OD_IOP_MMHG: 16
OS_IOP_MMHG: 14

## 2022-12-23 ASSESSMENT — PACHYMETRY
OS_CT_UM: 582
OD_CT_UM: 579
OD_CT_CORRECTION: -2
OS_CT_CORRECTION: -3

## 2022-12-23 ASSESSMENT — VISUAL ACUITY
OD_BCVA: 20/70+2
OS_BCVA: 20/100

## 2023-01-04 ENCOUNTER — AMBULATORY SURGERY CENTER (OUTPATIENT)
Dept: URBAN - METROPOLITAN AREA SURGERY 17 | Facility: SURGERY | Age: 73
Setting detail: OPHTHALMOLOGY
End: 2023-01-04
Payer: MEDICARE

## 2023-01-04 DIAGNOSIS — H25.12: ICD-10-CM

## 2023-01-04 DIAGNOSIS — H40.1321: ICD-10-CM

## 2023-01-04 PROCEDURE — 66984 XCAPSL CTRC RMVL W/O ECP: CPT | Performed by: OPHTHALMOLOGY

## 2023-01-04 PROCEDURE — 65820 GONIOTOMY: CPT | Performed by: OPHTHALMOLOGY

## 2023-01-05 ENCOUNTER — RX ONLY (RX ONLY)
Age: 73
End: 2023-01-05

## 2023-01-05 ENCOUNTER — OFFICE (OUTPATIENT)
Dept: URBAN - METROPOLITAN AREA CLINIC 29 | Facility: CLINIC | Age: 73
Setting detail: OPHTHALMOLOGY
End: 2023-01-05
Payer: MEDICARE

## 2023-01-05 DIAGNOSIS — H40.1321: ICD-10-CM

## 2023-01-05 DIAGNOSIS — H52.213: ICD-10-CM

## 2023-01-05 DIAGNOSIS — H21.233: ICD-10-CM

## 2023-01-05 DIAGNOSIS — H25.12: ICD-10-CM

## 2023-01-05 DIAGNOSIS — Z96.1: ICD-10-CM

## 2023-01-05 DIAGNOSIS — H53.001: ICD-10-CM

## 2023-01-05 DIAGNOSIS — E11.9: ICD-10-CM

## 2023-01-05 DIAGNOSIS — H40.1312: ICD-10-CM

## 2023-01-05 PROCEDURE — 99024 POSTOP FOLLOW-UP VISIT: CPT | Performed by: OPHTHALMOLOGY

## 2023-01-05 ASSESSMENT — SPHEQUIV_DERIVED
OS_SPHEQUIV: -8
OD_SPHEQUIV: -13.5

## 2023-01-05 ASSESSMENT — CONFRONTATIONAL VISUAL FIELD TEST (CVF)
OS_FINDINGS: FULL
OD_FINDINGS: FULL

## 2023-01-05 ASSESSMENT — TONOMETRY
OS_IOP_MMHG: 14
OD_IOP_MMHG: 17

## 2023-01-05 ASSESSMENT — REFRACTION_CURRENTRX
OD_OVR_VA: 20/
OS_CYLINDER: +1.00
OS_OVR_VA: 20/
OD_SPHERE: -8.50
OS_AXIS: 94
OD_AXIS: 115
OD_CYLINDER: +1.00
OS_SPHERE: -8.25

## 2023-01-05 ASSESSMENT — REFRACTION_AUTOREFRACTION
OS_SPHERE: -8.25
OS_CYLINDER: +0.50
OD_CYLINDER: +0.50
OD_AXIS: 145
OS_AXIS: 115
OD_SPHERE: -13.75

## 2023-01-05 ASSESSMENT — PACHYMETRY
OD_CT_CORRECTION: -2
OS_CT_CORRECTION: -3
OD_CT_UM: 579
OS_CT_UM: 582

## 2023-01-05 ASSESSMENT — REFRACTION_MANIFEST
OS_SPHERE: -2.00
OD_CYLINDER: SPHERE
OD_SPHERE: -2.25
OS_CYLINDER: SPHERE
OS_VA1: 20/25-
OD_VA1: 20/70-

## 2023-01-05 ASSESSMENT — VISUAL ACUITY
OD_BCVA: 20/25
OS_BCVA: 20/70

## 2023-01-27 ENCOUNTER — OFFICE (OUTPATIENT)
Dept: URBAN - METROPOLITAN AREA CLINIC 29 | Facility: CLINIC | Age: 73
Setting detail: OPHTHALMOLOGY
End: 2023-01-27
Payer: MEDICARE

## 2023-01-27 DIAGNOSIS — E11.9: ICD-10-CM

## 2023-01-27 DIAGNOSIS — H21.233: ICD-10-CM

## 2023-01-27 DIAGNOSIS — H25.12: ICD-10-CM

## 2023-01-27 DIAGNOSIS — H52.213: ICD-10-CM

## 2023-01-27 DIAGNOSIS — H52.4: ICD-10-CM

## 2023-01-27 DIAGNOSIS — H53.001: ICD-10-CM

## 2023-01-27 DIAGNOSIS — H40.1321: ICD-10-CM

## 2023-01-27 DIAGNOSIS — H40.1312: ICD-10-CM

## 2023-01-27 DIAGNOSIS — Z96.1: ICD-10-CM

## 2023-01-27 PROCEDURE — 99024 POSTOP FOLLOW-UP VISIT: CPT | Performed by: OPHTHALMOLOGY

## 2023-01-27 PROCEDURE — 92015 DETERMINE REFRACTIVE STATE: CPT | Performed by: OPHTHALMOLOGY

## 2023-01-27 ASSESSMENT — REFRACTION_CURRENTRX
OD_CYLINDER: +1.00
OS_OVR_VA: 20/
OS_AXIS: 94
OD_SPHERE: -8.50
OS_CYLINDER: +1.00
OD_OVR_VA: 20/
OS_SPHERE: -8.25
OD_AXIS: 115

## 2023-01-27 ASSESSMENT — REFRACTION_MANIFEST
OD_SPHERE: -2.25
OS_VA1: 20/20
OS_ADD: +2.75
OD_SPHERE: -2.00
OD_VA1: 20/70-
OS_CYLINDER: SPHERE
OD_VA1: 20/30-2
OS_CYLINDER: SPH
OD_AXIS: 120
OS_SPHERE: -2.25
OS_VA1: 20/25-
OD_CYLINDER: SPHERE
OS_SPHERE: -2.00
OD_CYLINDER: +0.50
OD_ADD: +2.75

## 2023-01-27 ASSESSMENT — TONOMETRY
OD_IOP_MMHG: 15
OS_IOP_MMHG: 13

## 2023-01-27 ASSESSMENT — PACHYMETRY
OS_CT_UM: 582
OD_CT_CORRECTION: -2
OS_CT_CORRECTION: -3
OD_CT_UM: 579

## 2023-01-27 ASSESSMENT — VISUAL ACUITY
OD_BCVA: 20/20+2
OS_BCVA: 20/40-2

## 2023-01-27 ASSESSMENT — REFRACTION_AUTOREFRACTION
OD_CYLINDER: +0.25
OD_SPHERE: -1.75
OD_AXIS: 120
OS_SPHERE: -2.25
OS_CYLINDER: SPH

## 2023-01-27 ASSESSMENT — SPHEQUIV_DERIVED
OD_SPHEQUIV: -1.75
OD_SPHEQUIV: -1.625

## 2023-01-27 ASSESSMENT — CONFRONTATIONAL VISUAL FIELD TEST (CVF)
OS_FINDINGS: FULL
OD_FINDINGS: FULL

## 2023-04-21 ENCOUNTER — OFFICE (OUTPATIENT)
Dept: URBAN - METROPOLITAN AREA CLINIC 29 | Facility: CLINIC | Age: 73
Setting detail: OPHTHALMOLOGY
End: 2023-04-21
Payer: MEDICARE

## 2023-04-21 DIAGNOSIS — H40.1321: ICD-10-CM

## 2023-04-21 DIAGNOSIS — H52.213: ICD-10-CM

## 2023-04-21 DIAGNOSIS — H40.1313: ICD-10-CM

## 2023-04-21 DIAGNOSIS — Z96.1: ICD-10-CM

## 2023-04-21 DIAGNOSIS — E11.9: ICD-10-CM

## 2023-04-21 DIAGNOSIS — H21.233: ICD-10-CM

## 2023-04-21 DIAGNOSIS — H53.001: ICD-10-CM

## 2023-04-21 PROCEDURE — 92083 EXTENDED VISUAL FIELD XM: CPT | Performed by: OPHTHALMOLOGY

## 2023-04-21 PROCEDURE — 99214 OFFICE O/P EST MOD 30 MIN: CPT | Performed by: OPHTHALMOLOGY

## 2023-04-21 PROCEDURE — 92020 GONIOSCOPY: CPT | Performed by: OPHTHALMOLOGY

## 2023-04-21 ASSESSMENT — PACHYMETRY
OS_CT_UM: 582
OD_CT_UM: 579
OS_CT_CORRECTION: -3
OD_CT_CORRECTION: -2

## 2023-04-21 ASSESSMENT — REFRACTION_AUTOREFRACTION
OS_SPHERE: -2.25
OD_SPHERE: -1.75
OD_AXIS: 120
OS_CYLINDER: SPH
OD_CYLINDER: +0.25

## 2023-04-21 ASSESSMENT — TONOMETRY
OD_IOP_MMHG: 15
OS_IOP_MMHG: 14

## 2023-04-21 ASSESSMENT — REFRACTION_MANIFEST
OS_CYLINDER: SPH
OD_VA1: 20/30-2
OD_CYLINDER: SPHERE
OD_AXIS: 120
OS_SPHERE: -2.00
OS_CYLINDER: SPHERE
OS_SPHERE: -2.25
OS_VA1: 20/25-
OD_SPHERE: -2.00
OD_SPHERE: -2.25
OD_ADD: +2.75
OS_VA1: 20/20
OS_ADD: +2.75
OD_VA1: 20/70-
OD_CYLINDER: +0.50

## 2023-04-21 ASSESSMENT — REFRACTION_CURRENTRX
OS_OVR_VA: 20/
OS_CYLINDER: +1.00
OD_ADD: +3.00
OS_SPHERE: -2.25
OD_VPRISM_DIRECTION: PROGS
OD_CYLINDER: +0.75
OD_SPHERE: -8.50
OS_AXIS: 94
OD_OVR_VA: 20/
OD_OVR_VA: 20/
OS_ADD: +3.00
OD_AXIS: 115
OD_CYLINDER: +1.00
OS_OVR_VA: 20/
OD_SPHERE: -2.00
OS_VPRISM_DIRECTION: PROGS
OS_SPHERE: -8.25
OD_AXIS: 110
OS_CYLINDER: SPHERE

## 2023-04-21 ASSESSMENT — VISUAL ACUITY
OS_BCVA: 20/30-
OD_BCVA: 20/20-1

## 2023-04-21 ASSESSMENT — SPHEQUIV_DERIVED
OD_SPHEQUIV: -1.625
OD_SPHEQUIV: -1.75

## 2023-10-20 ENCOUNTER — OFFICE (OUTPATIENT)
Dept: URBAN - METROPOLITAN AREA CLINIC 29 | Facility: CLINIC | Age: 73
Setting detail: OPHTHALMOLOGY
End: 2023-10-20
Payer: MEDICARE

## 2023-10-20 DIAGNOSIS — H52.213: ICD-10-CM

## 2023-10-20 DIAGNOSIS — H21.233: ICD-10-CM

## 2023-10-20 DIAGNOSIS — E11.9: ICD-10-CM

## 2023-10-20 DIAGNOSIS — H53.001: ICD-10-CM

## 2023-10-20 DIAGNOSIS — Z96.1: ICD-10-CM

## 2023-10-20 DIAGNOSIS — H40.1321: ICD-10-CM

## 2023-10-20 DIAGNOSIS — H40.1313: ICD-10-CM

## 2023-10-20 PROCEDURE — 92133 CPTRZD OPH DX IMG PST SGM ON: CPT | Performed by: OPHTHALMOLOGY

## 2023-10-20 PROCEDURE — 92020 GONIOSCOPY: CPT | Performed by: OPHTHALMOLOGY

## 2023-10-20 PROCEDURE — 92014 COMPRE OPH EXAM EST PT 1/>: CPT | Performed by: OPHTHALMOLOGY

## 2023-10-20 ASSESSMENT — REFRACTION_CURRENTRX
OS_SPHERE: -8.25
OS_CYLINDER: SPHERE
OD_ADD: +3.00
OS_ADD: +3.00
OS_AXIS: 94
OD_CYLINDER: +0.75
OD_AXIS: 115
OS_VPRISM_DIRECTION: PROGS
OD_SPHERE: -8.50
OD_CYLINDER: +1.00
OD_AXIS: 110
OD_SPHERE: -2.00
OS_OVR_VA: 20/
OS_SPHERE: -2.25
OD_OVR_VA: 20/
OD_VPRISM_DIRECTION: PROGS
OS_OVR_VA: 20/
OD_OVR_VA: 20/
OS_CYLINDER: +1.00

## 2023-10-20 ASSESSMENT — REFRACTION_MANIFEST
OS_VA1: 20/25-
OD_VA1: 20/30-2
OS_CYLINDER: SPH
OD_VA1: 20/70-
OD_AXIS: 120
OS_VA1: 20/20
OS_SPHERE: -2.00
OS_ADD: +2.75
OD_SPHERE: -2.00
OS_CYLINDER: SPHERE
OD_CYLINDER: SPHERE
OD_CYLINDER: +0.50
OS_SPHERE: -2.25
OD_SPHERE: -2.25
OD_ADD: +2.75

## 2023-10-20 ASSESSMENT — TONOMETRY
OS_IOP_MMHG: 12
OD_IOP_MMHG: 14

## 2023-10-20 ASSESSMENT — REFRACTION_AUTOREFRACTION
OD_CYLINDER: +0.25
OD_AXIS: 120
OD_SPHERE: -1.75
OS_SPHERE: -2.25
OS_CYLINDER: SPH

## 2023-10-20 ASSESSMENT — VISUAL ACUITY
OD_BCVA: 20/20-2
OS_BCVA: 20/40

## 2023-10-20 ASSESSMENT — PACHYMETRY
OD_CT_UM: 579
OS_CT_UM: 582
OS_CT_CORRECTION: -3
OD_CT_CORRECTION: -2

## 2023-10-20 ASSESSMENT — CONFRONTATIONAL VISUAL FIELD TEST (CVF)
OS_FINDINGS: FULL
OD_FINDINGS: FULL

## 2023-10-20 ASSESSMENT — SPHEQUIV_DERIVED
OD_SPHEQUIV: -1.75
OD_SPHEQUIV: -1.625

## 2024-04-26 ENCOUNTER — OFFICE (OUTPATIENT)
Dept: URBAN - METROPOLITAN AREA CLINIC 29 | Facility: CLINIC | Age: 74
Setting detail: OPHTHALMOLOGY
End: 2024-04-26
Payer: MEDICARE

## 2024-04-26 DIAGNOSIS — H52.213: ICD-10-CM

## 2024-04-26 DIAGNOSIS — H40.1321: ICD-10-CM

## 2024-04-26 DIAGNOSIS — H40.1313: ICD-10-CM

## 2024-04-26 PROBLEM — H26.491 POSTERIOR CAPSULAR OPACIFICATION; RIGHT EYE: Status: ACTIVE | Noted: 2024-04-26

## 2024-04-26 PROCEDURE — 92014 COMPRE OPH EXAM EST PT 1/>: CPT | Performed by: OPHTHALMOLOGY

## 2024-04-26 PROCEDURE — 92083 EXTENDED VISUAL FIELD XM: CPT | Performed by: OPHTHALMOLOGY
